# Patient Record
Sex: FEMALE | URBAN - METROPOLITAN AREA
[De-identification: names, ages, dates, MRNs, and addresses within clinical notes are randomized per-mention and may not be internally consistent; named-entity substitution may affect disease eponyms.]

---

## 2018-02-06 PROBLEM — Z00.00 ENCOUNTER FOR PREVENTIVE HEALTH EXAMINATION: Status: ACTIVE | Noted: 2018-02-06

## 2018-02-09 NOTE — ASU PATIENT PROFILE, ADULT - VISION (WITH CORRECTIVE LENSES IF THE PATIENT USUALLY WEARS THEM):
Cane/Partially impaired: cannot see medication labels or newsprint, but can see obstacles in path, and the surrounding layout; can count fingers at arm's length

## 2018-02-09 NOTE — ASU PATIENT PROFILE, ADULT - PMH
CKD (chronic kidney disease)    HTN (hypertension)    IDDM (insulin dependent diabetes mellitus)    Lymphedema

## 2018-02-13 ENCOUNTER — OUTPATIENT (OUTPATIENT)
Dept: OUTPATIENT SERVICES | Facility: HOSPITAL | Age: 73
LOS: 1 days | End: 2018-02-13
Payer: MEDICARE

## 2018-02-13 ENCOUNTER — TRANSCRIPTION ENCOUNTER (OUTPATIENT)
Age: 73
End: 2018-02-13

## 2018-02-13 VITALS
HEART RATE: 74 BPM | RESPIRATION RATE: 16 BRPM | SYSTOLIC BLOOD PRESSURE: 152 MMHG | OXYGEN SATURATION: 100 % | DIASTOLIC BLOOD PRESSURE: 75 MMHG

## 2018-02-13 VITALS
SYSTOLIC BLOOD PRESSURE: 177 MMHG | HEIGHT: 61 IN | TEMPERATURE: 98 F | OXYGEN SATURATION: 100 % | HEART RATE: 78 BPM | RESPIRATION RATE: 13 BRPM | WEIGHT: 274.26 LBS | DIASTOLIC BLOOD PRESSURE: 91 MMHG

## 2018-02-13 DIAGNOSIS — H25.21 AGE-RELATED CATARACT, MORGAGNIAN TYPE, RIGHT EYE: ICD-10-CM

## 2018-02-13 DIAGNOSIS — Z41.9 ENCOUNTER FOR PROCEDURE FOR PURPOSES OTHER THAN REMEDYING HEALTH STATE, UNSPECIFIED: Chronic | ICD-10-CM

## 2018-02-13 LAB — GLUCOSE BLDC GLUCOMTR-MCNC: 166 MG/DL — HIGH (ref 70–99)

## 2018-02-13 PROCEDURE — 66984 XCAPSL CTRC RMVL W/O ECP: CPT | Mod: RT

## 2018-02-13 PROCEDURE — 82962 GLUCOSE BLOOD TEST: CPT

## 2018-02-13 PROCEDURE — C1780: CPT

## 2018-02-13 NOTE — ASU DISCHARGE PLAN (ADULT/PEDIATRIC). - NOTIFY
Bleeding that does not stop/Swelling that continues/Fever greater than 101/Pain not relieved by Medications/Persistent Nausea and Vomiting

## 2018-02-13 NOTE — ASU DISCHARGE PLAN (ADULT/PEDIATRIC). - NURSING INSTRUCTIONS
Tylenol or extra strength tylenol if needed for discomfort, avoid   Advil, Motrin, Aleve and aspirin to minimize bleeding.

## 2022-11-07 ENCOUNTER — OFFICE (OUTPATIENT)
Dept: URBAN - METROPOLITAN AREA CLINIC 90 | Facility: CLINIC | Age: 77
Setting detail: OPHTHALMOLOGY
End: 2022-11-07
Payer: MEDICARE

## 2022-11-07 DIAGNOSIS — H50.10: ICD-10-CM

## 2022-11-07 DIAGNOSIS — H18.053: ICD-10-CM

## 2022-11-07 DIAGNOSIS — H21.233: ICD-10-CM

## 2022-11-07 DIAGNOSIS — H40.1132: ICD-10-CM

## 2022-11-07 DIAGNOSIS — H26.491: ICD-10-CM

## 2022-11-07 DIAGNOSIS — H35.341: ICD-10-CM

## 2022-11-07 DIAGNOSIS — H25.12: ICD-10-CM

## 2022-11-07 DIAGNOSIS — E11.9: ICD-10-CM

## 2022-11-07 PROCEDURE — 92014 COMPRE OPH EXAM EST PT 1/>: CPT | Performed by: OPHTHALMOLOGY

## 2022-11-07 ASSESSMENT — REFRACTION_MANIFEST
OD_VA1: 20/60-1
OS_AXIS: 060
OS_VA1: 20/80
OS_SPHERE: -1.00
OD_AXIS: 105
OD_CYLINDER: -2.00
OS_VA2: 20/50-
OS_CYLINDER: -3.25
OD_AXIS: 105
OS_AXIS: 075
OS_SPHERE: -2.50
OS_CYLINDER: -1.00
OD_SPHERE: -0.25
OD_VA1: 20/60
OD_VA2: 20/50-
OD_ADD: +3.00
OD_SPHERE: -0.25
OD_CYLINDER: -2.00
OS_ADD: +3.00

## 2022-11-07 ASSESSMENT — KERATOMETRY
OD_K1POWER_DIOPTERS: 41.75
OS_K2POWER_DIOPTERS: 43.75
METHOD_AUTO_MANUAL: AUTO
OS_K1POWER_DIOPTERS: 42.00
OD_AXISANGLE_DEGREES: 014
OD_K2POWER_DIOPTERS: 43.00
OS_AXISANGLE_DEGREES: 141

## 2022-11-07 ASSESSMENT — REFRACTION_CURRENTRX
OS_SPHERE: -3.00
OS_VPRISM_DIRECTION: SV
OD_VPRISM_DIRECTION: SV
OS_OVR_VA: 20/
OD_AXIS: 109
OS_SPHERE: +0.50
OS_AXIS: 057
OD_AXIS: 121
OS_OVR_VA: 20/
OD_SPHERE: +2.75
OS_AXIS: 059
OD_OVR_VA: 20/
OS_CYLINDER: -1.00
OD_AXIS: 105
OD_SPHERE: -0.25
OS_AXIS: 062
OS_AXIS: 057
OS_CYLINDER: -1.00
OS_VPRISM_DIRECTION: SV
OS_CYLINDER: -1.00
OD_SPHERE: -0.25
OD_CYLINDER: -2.00
OS_SPHERE: -2.50
OS_VPRISM_DIRECTION: SV
OS_VPRISM_DIRECTION: SV
OD_AXIS: 098
OD_VPRISM_DIRECTION: SV
OD_CYLINDER: -2.00
OS_CYLINDER: -1.25
OS_OVR_VA: 20/
OD_SPHERE: -3.00
OS_SPHERE: -2.50
OD_VPRISM_DIRECTION: SV
OD_CYLINDER: -1.25
OD_CYLINDER: -2.00
OD_OVR_VA: 20/
OD_OVR_VA: 20/
OD_VPRISM_DIRECTION: SV

## 2022-11-07 ASSESSMENT — REFRACTION_AUTOREFRACTION
OD_AXIS: 104
OS_AXIS: 077
OS_SPHERE: -0.50
OD_SPHERE: -0.50
OS_CYLINDER: -4.00
OD_CYLINDER: -1.50

## 2022-11-07 ASSESSMENT — CONFRONTATIONAL VISUAL FIELD TEST (CVF)
OS_FINDINGS: FULL
OD_FINDINGS: FULL

## 2022-11-07 ASSESSMENT — SPHEQUIV_DERIVED
OS_SPHEQUIV: -3
OS_SPHEQUIV: -2.625
OD_SPHEQUIV: -1.25
OS_SPHEQUIV: -2.5
OD_SPHEQUIV: -1.25
OD_SPHEQUIV: -1.25

## 2022-11-07 ASSESSMENT — AXIALLENGTH_DERIVED
OD_AL: 24.5278
OS_AL: 25.0784
OS_AL: 24.9144
OS_AL: 24.8602
OD_AL: 24.5278
OD_AL: 24.5278

## 2022-11-07 ASSESSMENT — TONOMETRY
OS_IOP_MMHG: 11
OD_IOP_MMHG: 11

## 2022-11-07 ASSESSMENT — PUNCTA - ASSESSMENT
OD_PUNCTA: RLL EVERSION
OS_PUNCTA: LLL EVERSION

## 2022-11-07 ASSESSMENT — VISUAL ACUITY
OD_BCVA: 20/150
OS_BCVA: 20/60-2

## 2023-03-27 ENCOUNTER — OFFICE (OUTPATIENT)
Dept: URBAN - METROPOLITAN AREA CLINIC 90 | Facility: CLINIC | Age: 78
Setting detail: OPHTHALMOLOGY
End: 2023-03-27
Payer: MEDICARE

## 2023-03-27 DIAGNOSIS — H21.233: ICD-10-CM

## 2023-03-27 DIAGNOSIS — H40.1132: ICD-10-CM

## 2023-03-27 DIAGNOSIS — H18.053: ICD-10-CM

## 2023-03-27 DIAGNOSIS — H26.491: ICD-10-CM

## 2023-03-27 DIAGNOSIS — H25.12: ICD-10-CM

## 2023-03-27 DIAGNOSIS — H35.341: ICD-10-CM

## 2023-03-27 DIAGNOSIS — H50.10: ICD-10-CM

## 2023-03-27 DIAGNOSIS — E11.9: ICD-10-CM

## 2023-03-27 PROCEDURE — 92134 CPTRZ OPH DX IMG PST SGM RTA: CPT | Performed by: OPHTHALMOLOGY

## 2023-03-27 PROCEDURE — 92014 COMPRE OPH EXAM EST PT 1/>: CPT | Performed by: OPHTHALMOLOGY

## 2023-03-27 ASSESSMENT — REFRACTION_MANIFEST
OS_AXIS: 075
OS_VA2: 20/50-
OD_VA1: 20/60-1
OD_AXIS: 105
OS_VA1: 20/80
OS_ADD: +3.00
OD_CYLINDER: -2.00
OD_VA2: 20/50-
OS_SPHERE: -1.00
OD_SPHERE: -0.25
OS_CYLINDER: -1.00
OD_SPHERE: -0.25
OD_ADD: +3.00
OS_CYLINDER: -3.25
OD_VA1: 20/60
OD_AXIS: 105
OD_CYLINDER: -2.00
OS_SPHERE: -2.50
OS_AXIS: 060

## 2023-03-27 ASSESSMENT — AXIALLENGTH_DERIVED
OD_AL: 24.4723
OS_AL: 25.1304
OS_AL: 24.9657
OS_AL: 25.0203
OD_AL: 24.5776
OD_AL: 24.5776

## 2023-03-27 ASSESSMENT — PUNCTA - ASSESSMENT
OD_PUNCTA: RLL EVERSION
OS_PUNCTA: LLL EVERSION

## 2023-03-27 ASSESSMENT — REFRACTION_CURRENTRX
OS_CYLINDER: -1.00
OS_VPRISM_DIRECTION: SV
OD_CYLINDER: -1.25
OD_VPRISM_DIRECTION: SV
OS_SPHERE: -2.50
OD_CYLINDER: -2.00
OD_VPRISM_DIRECTION: SV
OD_SPHERE: -3.00
OS_CYLINDER: -1.00
OS_CYLINDER: -1.25
OD_OVR_VA: 20/
OS_OVR_VA: 20/
OD_OVR_VA: 20/
OD_VPRISM_DIRECTION: SV
OS_AXIS: 065
OS_AXIS: 059
OD_AXIS: 121
OS_SPHERE: -2.50
OS_AXIS: 057
OD_OVR_VA: 20/
OS_OVR_VA: 20/
OS_AXIS: 057
OS_OVR_VA: 20/
OD_CYLINDER: -2.00
OS_VPRISM_DIRECTION: SV
OD_AXIS: 098
OD_SPHERE: -0.25
OD_SPHERE: +2.75
OS_SPHERE: +0.50
OD_SPHERE: -0.25
OS_CYLINDER: -1.00
OS_VPRISM_DIRECTION: SV
OD_AXIS: 109
OS_SPHERE: -3.00
OS_VPRISM_DIRECTION: SV
OD_CYLINDER: -2.00
OD_VPRISM_DIRECTION: SV
OD_AXIS: 109

## 2023-03-27 ASSESSMENT — KERATOMETRY
METHOD_AUTO_MANUAL: AUTO
OS_AXISANGLE_DEGREES: 142
OS_K2POWER_DIOPTERS: 43.50
OD_K1POWER_DIOPTERS: 41.75
OD_AXISANGLE_DEGREES: 018
OS_K1POWER_DIOPTERS: 42.00
OD_K2POWER_DIOPTERS: 42.75

## 2023-03-27 ASSESSMENT — CONFRONTATIONAL VISUAL FIELD TEST (CVF)
OS_FINDINGS: FULL
OD_FINDINGS: FULL

## 2023-03-27 ASSESSMENT — VISUAL ACUITY
OD_BCVA: 20/150+1
OS_BCVA: 20/60-2

## 2023-03-27 ASSESSMENT — REFRACTION_AUTOREFRACTION
OS_CYLINDER: -3.00
OD_AXIS: 099
OD_CYLINDER: -1.50
OS_AXIS: 067
OS_SPHERE: -1.25
OD_SPHERE: -0.25

## 2023-03-27 ASSESSMENT — SPHEQUIV_DERIVED
OD_SPHEQUIV: -1
OS_SPHEQUIV: -2.625
OS_SPHEQUIV: -3
OD_SPHEQUIV: -1.25
OS_SPHEQUIV: -2.75
OD_SPHEQUIV: -1.25

## 2023-03-27 ASSESSMENT — TONOMETRY
OD_IOP_MMHG: 15
OS_IOP_MMHG: 14

## 2024-07-12 ENCOUNTER — OFFICE (OUTPATIENT)
Dept: URBAN - METROPOLITAN AREA CLINIC 90 | Facility: CLINIC | Age: 79
Setting detail: OPHTHALMOLOGY
End: 2024-07-12
Payer: MEDICARE

## 2024-07-12 DIAGNOSIS — H26.491: ICD-10-CM

## 2024-07-12 DIAGNOSIS — H21.233: ICD-10-CM

## 2024-07-12 DIAGNOSIS — H40.1132: ICD-10-CM

## 2024-07-12 DIAGNOSIS — H25.12: ICD-10-CM

## 2024-07-12 DIAGNOSIS — H35.341: ICD-10-CM

## 2024-07-12 DIAGNOSIS — H18.053: ICD-10-CM

## 2024-07-12 DIAGNOSIS — H50.10: ICD-10-CM

## 2024-07-12 DIAGNOSIS — E11.9: ICD-10-CM

## 2024-07-12 PROBLEM — H33.102: Status: ACTIVE | Noted: 2024-07-12

## 2024-07-12 PROCEDURE — 92014 COMPRE OPH EXAM EST PT 1/>: CPT | Performed by: OPHTHALMOLOGY

## 2024-07-12 PROCEDURE — 92250 FUNDUS PHOTOGRAPHY W/I&R: CPT | Performed by: OPHTHALMOLOGY

## 2024-07-12 ASSESSMENT — CONFRONTATIONAL VISUAL FIELD TEST (CVF)
OD_FINDINGS: FULL
OS_FINDINGS: FULL

## 2024-11-07 ENCOUNTER — OFFICE (OUTPATIENT)
Age: 79
Setting detail: OPHTHALMOLOGY
End: 2024-11-07
Payer: MEDICARE

## 2024-11-07 DIAGNOSIS — E11.9: ICD-10-CM

## 2024-11-07 DIAGNOSIS — H35.341: ICD-10-CM

## 2024-11-07 DIAGNOSIS — H18.053: ICD-10-CM

## 2024-11-07 DIAGNOSIS — H25.12: ICD-10-CM

## 2024-11-07 DIAGNOSIS — H21.233: ICD-10-CM

## 2024-11-07 DIAGNOSIS — H26.491: ICD-10-CM

## 2024-11-07 DIAGNOSIS — H50.10: ICD-10-CM

## 2024-11-07 DIAGNOSIS — H33.102: ICD-10-CM

## 2024-11-07 DIAGNOSIS — H40.1132: ICD-10-CM

## 2024-11-07 PROCEDURE — 92134 CPTRZ OPH DX IMG PST SGM RTA: CPT | Performed by: OPHTHALMOLOGY

## 2024-11-07 PROCEDURE — 92012 INTRM OPH EXAM EST PATIENT: CPT | Performed by: OPHTHALMOLOGY

## 2024-11-07 ASSESSMENT — REFRACTION_MANIFEST
OD_VA2: 20/50-
OD_AXIS: 105
OS_VA2: 20/50-
OS_CYLINDER: -3.25
OS_ADD: +3.00
OD_AXIS: 105
OD_CYLINDER: -2.00
OD_SPHERE: -0.25
OD_VA1: 20/60
OS_AXIS: 060
OS_SPHERE: -1.00
OS_SPHERE: -2.50
OS_AXIS: 075
OD_SPHERE: -0.25
OS_VA1: 20/80
OS_CYLINDER: -1.00
OD_ADD: +3.00
OD_CYLINDER: -2.00
OD_VA1: 20/60-1

## 2024-11-07 ASSESSMENT — REFRACTION_CURRENTRX
OS_SPHERE: -2.50
OD_SPHERE: -0.25
OD_AXIS: 098
OS_OVR_VA: 20/
OS_SPHERE: -2.50
OS_AXIS: 057
OD_AXIS: 109
OD_VPRISM_DIRECTION: SV
OS_CYLINDER: -1.00
OS_SPHERE: +0.50
OD_CYLINDER: -2.00
OS_VPRISM_DIRECTION: SV
OS_CYLINDER: -1.00
OD_SPHERE: +2.75
OD_OVR_VA: 20/
OS_AXIS: 057
OD_AXIS: 107
OS_VPRISM_DIRECTION: SV
OS_VPRISM_DIRECTION: SV
OD_SPHERE: -0.25
OS_CYLINDER: -1.00
OD_VPRISM_DIRECTION: SV
OD_OVR_VA: 20/
OD_AXIS: 109
OD_OVR_VA: 20/
OS_AXIS: 058
OD_CYLINDER: -2.00
OS_CYLINDER: -1.00
OD_CYLINDER: -2.00
OS_SPHERE: -2.50
OS_SPHERE: -2.50
OD_SPHERE: -0.25
OS_AXIS: 062
OD_CYLINDER: -2.00
OS_VPRISM_DIRECTION: SV
OD_CYLINDER: -2.00
OD_SPHERE: -0.25
OS_OVR_VA: 20/
OD_VPRISM_DIRECTION: SV
OD_AXIS: 112
OD_VPRISM_DIRECTION: SV
OS_CYLINDER: -1.00
OS_AXIS: 065
OS_OVR_VA: 20/

## 2024-11-07 ASSESSMENT — KERATOMETRY
OD_K2POWER_DIOPTERS: 42.75
OD_AXISANGLE_DEGREES: 025
METHOD_AUTO_MANUAL: AUTO
OS_AXISANGLE_DEGREES: 151
OD_K1POWER_DIOPTERS: 42.25
OS_K2POWER_DIOPTERS: 43.50
OS_K1POWER_DIOPTERS: 41.50

## 2024-11-07 ASSESSMENT — VISUAL ACUITY
OS_BCVA: 20/70
OD_BCVA: 20/100-1

## 2024-11-07 ASSESSMENT — CORNEAL DYSTROPHY - POSTERIOR
OD_POSTERIORDYSTROPHY: GUTTATA
OS_POSTERIORDYSTROPHY: GUTTATA

## 2024-11-07 ASSESSMENT — REFRACTION_AUTOREFRACTION
OS_AXIS: 075
OS_SPHERE: -0.50
OD_CYLINDER: -1.25
OS_CYLINDER: -3.25
OD_AXIS: 108
OD_SPHERE: -0.25

## 2024-11-07 ASSESSMENT — PUNCTA - ASSESSMENT
OD_PUNCTA: RLL EVERSION
OS_PUNCTA: LLL EVERSION

## 2024-11-07 ASSESSMENT — TONOMETRY
OD_IOP_MMHG: 18
OS_IOP_MMHG: 16

## 2025-03-17 ENCOUNTER — RX ONLY (RX ONLY)
Age: 80
End: 2025-03-17

## 2025-03-17 ENCOUNTER — OFFICE (OUTPATIENT)
Facility: LOCATION | Age: 80
Setting detail: OPHTHALMOLOGY
End: 2025-03-17
Payer: MEDICARE

## 2025-03-17 DIAGNOSIS — H18.053: ICD-10-CM

## 2025-03-17 DIAGNOSIS — H26.491: ICD-10-CM

## 2025-03-17 DIAGNOSIS — H25.12: ICD-10-CM

## 2025-03-17 DIAGNOSIS — H50.10: ICD-10-CM

## 2025-03-17 DIAGNOSIS — H35.341: ICD-10-CM

## 2025-03-17 DIAGNOSIS — H21.233: ICD-10-CM

## 2025-03-17 DIAGNOSIS — H40.1132: ICD-10-CM

## 2025-03-17 DIAGNOSIS — E11.9: ICD-10-CM

## 2025-03-17 PROCEDURE — 92012 INTRM OPH EXAM EST PATIENT: CPT | Performed by: STUDENT IN AN ORGANIZED HEALTH CARE EDUCATION/TRAINING PROGRAM

## 2025-03-17 PROCEDURE — 92133 CPTRZD OPH DX IMG PST SGM ON: CPT | Performed by: STUDENT IN AN ORGANIZED HEALTH CARE EDUCATION/TRAINING PROGRAM

## 2025-03-17 ASSESSMENT — CORNEAL DYSTROPHY - POSTERIOR
OD_POSTERIORDYSTROPHY: GUTTATA
OS_POSTERIORDYSTROPHY: GUTTATA

## 2025-03-17 ASSESSMENT — TONOMETRY
OD_IOP_MMHG: 16
OS_IOP_MMHG: 16
OD_IOP_MMHG: 13
OS_IOP_MMHG: 14

## 2025-03-17 ASSESSMENT — CONFRONTATIONAL VISUAL FIELD TEST (CVF)
OD_FINDINGS: FULL
OS_FINDINGS: FULL

## 2025-03-17 ASSESSMENT — PUNCTA - ASSESSMENT
OS_PUNCTA: LLL EVERSION
OD_PUNCTA: RLL EVERSION

## 2025-03-18 ASSESSMENT — REFRACTION_AUTOREFRACTION
OD_AXIS: 110
OS_SPHERE: -0.75
OD_SPHERE: -0.25
OS_CYLINDER: -4.00
OD_CYLINDER: -1.75
OS_AXIS: 078

## 2025-03-18 ASSESSMENT — REFRACTION_CURRENTRX
OS_AXIS: 054
OD_AXIS: 098
OS_SPHERE: -2.50
OS_AXIS: 058
OS_CYLINDER: -1.00
OD_SPHERE: -0.25
OD_CYLINDER: -1.75
OD_VPRISM_DIRECTION: SV
OS_VPRISM_DIRECTION: SV
OS_SPHERE: +0.50
OS_CYLINDER: -1.00
OD_CYLINDER: -2.00
OD_SPHERE: +2.75
OS_CYLINDER: -1.00
OD_SPHERE: -0.25
OD_OVR_VA: 20/
OS_SPHERE: -2.50
OS_SPHERE: -2.50
OD_AXIS: 112
OD_VPRISM_DIRECTION: SV
OS_AXIS: 057
OD_OVR_VA: 20/
OS_SPHERE: -2.50
OD_AXIS: 114
OS_AXIS: 057
OS_OVR_VA: 20/
OS_VPRISM_DIRECTION: SV
OD_VPRISM_DIRECTION: SV
OS_OVR_VA: 20/
OD_AXIS: 109
OD_AXIS: 107
OS_CYLINDER: -1.00
OS_OVR_VA: 20/
OS_VPRISM_DIRECTION: SV
OS_SPHERE: -2.50
OS_VPRISM_DIRECTION: SV
OS_AXIS: 065
OD_CYLINDER: -2.00
OD_SPHERE: -0.25
OD_CYLINDER: -2.00
OS_AXIS: 062
OD_AXIS: 109
OD_SPHERE: -0.25
OD_SPHERE: -0.25
OS_CYLINDER: -1.00
OD_VPRISM_DIRECTION: SV
OD_OVR_VA: 20/
OD_CYLINDER: -2.00
OD_CYLINDER: -2.00
OS_VPRISM_DIRECTION: SV
OS_CYLINDER: -1.00
OD_VPRISM_DIRECTION: SV

## 2025-03-18 ASSESSMENT — REFRACTION_MANIFEST
OS_AXIS: 060
OD_AXIS: 105
OS_SPHERE: -2.50
OD_VA1: 20/60
OD_CYLINDER: -2.00
OS_CYLINDER: -1.00
OS_AXIS: 075
OS_SPHERE: -1.00
OS_VA1: 20/80
OD_CYLINDER: -2.00
OD_SPHERE: -0.25
OD_VA1: 20/60-1
OS_ADD: +3.00
OD_AXIS: 105
OD_VA2: 20/50-
OD_ADD: +3.00
OD_SPHERE: -0.25
OS_CYLINDER: -3.25
OS_VA2: 20/50-

## 2025-03-18 ASSESSMENT — VISUAL ACUITY
OS_BCVA: 20/60+2
OD_BCVA: 20/80+1

## 2025-03-18 ASSESSMENT — KERATOMETRY
OD_K1POWER_DIOPTERS: 42.00
OD_AXISANGLE_DEGREES: 027
OS_K2POWER_DIOPTERS: 43.50
OS_K1POWER_DIOPTERS: 41.50
OS_AXISANGLE_DEGREES: 151
OD_K2POWER_DIOPTERS: 43.00
METHOD_AUTO_MANUAL: AUTO

## 2025-03-27 PROBLEM — E11.9 TYPE 2 DIABETES MELLITUS WITHOUT COMPLICATIONS: Chronic | Status: ACTIVE | Noted: 2018-02-13

## 2025-03-27 PROBLEM — I89.0 LYMPHEDEMA, NOT ELSEWHERE CLASSIFIED: Chronic | Status: ACTIVE | Noted: 2018-02-13

## 2025-03-27 PROBLEM — I10 ESSENTIAL (PRIMARY) HYPERTENSION: Chronic | Status: ACTIVE | Noted: 2018-02-13

## 2025-03-27 PROBLEM — N18.9 CHRONIC KIDNEY DISEASE, UNSPECIFIED: Chronic | Status: ACTIVE | Noted: 2018-02-13

## 2025-04-08 ENCOUNTER — EMERGENCY (EMERGENCY)
Facility: HOSPITAL | Age: 80
LOS: 0 days | Discharge: HOME | End: 2025-04-08
Attending: EMERGENCY MEDICINE
Payer: MEDICARE

## 2025-04-08 VITALS
WEIGHT: 184.97 LBS | OXYGEN SATURATION: 100 % | HEART RATE: 83 BPM | RESPIRATION RATE: 18 BRPM | DIASTOLIC BLOOD PRESSURE: 103 MMHG | HEIGHT: 62 IN | TEMPERATURE: 98 F | SYSTOLIC BLOOD PRESSURE: 170 MMHG

## 2025-04-08 VITALS — SYSTOLIC BLOOD PRESSURE: 105 MMHG | DIASTOLIC BLOOD PRESSURE: 64 MMHG

## 2025-04-08 DIAGNOSIS — Z91.011 ALLERGY TO MILK PRODUCTS: ICD-10-CM

## 2025-04-08 DIAGNOSIS — Z88.0 ALLERGY STATUS TO PENICILLIN: ICD-10-CM

## 2025-04-08 DIAGNOSIS — Z41.9 ENCOUNTER FOR PROCEDURE FOR PURPOSES OTHER THAN REMEDYING HEALTH STATE, UNSPECIFIED: Chronic | ICD-10-CM

## 2025-04-08 DIAGNOSIS — I89.0 LYMPHEDEMA, NOT ELSEWHERE CLASSIFIED: ICD-10-CM

## 2025-04-08 DIAGNOSIS — M79.89 OTHER SPECIFIED SOFT TISSUE DISORDERS: ICD-10-CM

## 2025-04-08 DIAGNOSIS — I82.431 ACUTE EMBOLISM AND THROMBOSIS OF RIGHT POPLITEAL VEIN: ICD-10-CM

## 2025-04-08 LAB
ALBUMIN SERPL ELPH-MCNC: 3.8 G/DL — SIGNIFICANT CHANGE UP (ref 3.3–5)
ALP SERPL-CCNC: 120 U/L — SIGNIFICANT CHANGE UP (ref 40–120)
ALT FLD-CCNC: 16 U/L — SIGNIFICANT CHANGE UP (ref 12–78)
ANION GAP SERPL CALC-SCNC: 7 MMOL/L — SIGNIFICANT CHANGE UP (ref 5–17)
APTT BLD: 34.7 SEC — SIGNIFICANT CHANGE UP (ref 24.5–35.6)
AST SERPL-CCNC: 17 U/L — SIGNIFICANT CHANGE UP (ref 15–37)
BASOPHILS # BLD AUTO: 0.03 K/UL — SIGNIFICANT CHANGE UP (ref 0–0.2)
BASOPHILS NFR BLD AUTO: 0.5 % — SIGNIFICANT CHANGE UP (ref 0–2)
BILIRUB SERPL-MCNC: 0.5 MG/DL — SIGNIFICANT CHANGE UP (ref 0.2–1.2)
BUN SERPL-MCNC: 32 MG/DL — HIGH (ref 7–23)
CALCIUM SERPL-MCNC: 9.5 MG/DL — SIGNIFICANT CHANGE UP (ref 8.5–10.1)
CHLORIDE SERPL-SCNC: 109 MMOL/L — HIGH (ref 96–108)
CO2 SERPL-SCNC: 25 MMOL/L — SIGNIFICANT CHANGE UP (ref 22–31)
CREAT SERPL-MCNC: 1.61 MG/DL — HIGH (ref 0.5–1.3)
EGFR: 32 ML/MIN/1.73M2 — LOW
EGFR: 32 ML/MIN/1.73M2 — LOW
EOSINOPHIL # BLD AUTO: 0.14 K/UL — SIGNIFICANT CHANGE UP (ref 0–0.5)
EOSINOPHIL NFR BLD AUTO: 2.5 % — SIGNIFICANT CHANGE UP (ref 0–6)
GLUCOSE SERPL-MCNC: 144 MG/DL — HIGH (ref 70–99)
HCT VFR BLD CALC: 42.3 % — SIGNIFICANT CHANGE UP (ref 34.5–45)
HGB BLD-MCNC: 13.4 G/DL — SIGNIFICANT CHANGE UP (ref 11.5–15.5)
IMM GRANULOCYTES NFR BLD AUTO: 0.4 % — SIGNIFICANT CHANGE UP (ref 0–0.9)
INR BLD: 0.96 RATIO — SIGNIFICANT CHANGE UP (ref 0.85–1.16)
LYMPHOCYTES # BLD AUTO: 1.38 K/UL — SIGNIFICANT CHANGE UP (ref 1–3.3)
LYMPHOCYTES # BLD AUTO: 25 % — SIGNIFICANT CHANGE UP (ref 13–44)
MAGNESIUM SERPL-MCNC: 1.8 MG/DL — SIGNIFICANT CHANGE UP (ref 1.6–2.6)
MCHC RBC-ENTMCNC: 27.3 PG — SIGNIFICANT CHANGE UP (ref 27–34)
MCHC RBC-ENTMCNC: 31.7 G/DL — LOW (ref 32–36)
MCV RBC AUTO: 86.2 FL — SIGNIFICANT CHANGE UP (ref 80–100)
MONOCYTES # BLD AUTO: 0.3 K/UL — SIGNIFICANT CHANGE UP (ref 0–0.9)
MONOCYTES NFR BLD AUTO: 5.4 % — SIGNIFICANT CHANGE UP (ref 2–14)
NEUTROPHILS # BLD AUTO: 3.65 K/UL — SIGNIFICANT CHANGE UP (ref 1.8–7.4)
NEUTROPHILS NFR BLD AUTO: 66.2 % — SIGNIFICANT CHANGE UP (ref 43–77)
NRBC BLD AUTO-RTO: 0 /100 WBCS — SIGNIFICANT CHANGE UP (ref 0–0)
PLATELET # BLD AUTO: 143 K/UL — LOW (ref 150–400)
POTASSIUM SERPL-MCNC: 4.9 MMOL/L — SIGNIFICANT CHANGE UP (ref 3.5–5.3)
POTASSIUM SERPL-SCNC: 4.9 MMOL/L — SIGNIFICANT CHANGE UP (ref 3.5–5.3)
PROT SERPL-MCNC: 8.3 GM/DL — SIGNIFICANT CHANGE UP (ref 6–8.3)
PROTHROM AB SERPL-ACNC: 10.8 SEC — SIGNIFICANT CHANGE UP (ref 9.9–13.4)
RBC # BLD: 4.91 M/UL — SIGNIFICANT CHANGE UP (ref 3.8–5.2)
RBC # FLD: 13.9 % — SIGNIFICANT CHANGE UP (ref 10.3–14.5)
SODIUM SERPL-SCNC: 141 MMOL/L — SIGNIFICANT CHANGE UP (ref 135–145)
TROPONIN I, HIGH SENSITIVITY RESULT: 15.2 NG/L — SIGNIFICANT CHANGE UP
WBC # BLD: 5.52 K/UL — SIGNIFICANT CHANGE UP (ref 3.8–10.5)
WBC # FLD AUTO: 5.52 K/UL — SIGNIFICANT CHANGE UP (ref 3.8–10.5)

## 2025-04-08 PROCEDURE — 99285 EMERGENCY DEPT VISIT HI MDM: CPT

## 2025-04-08 PROCEDURE — 71275 CT ANGIOGRAPHY CHEST: CPT | Mod: 26

## 2025-04-08 PROCEDURE — 93970 EXTREMITY STUDY: CPT | Mod: 26

## 2025-04-08 RX ORDER — APIXABAN 2.5 MG/1
10 TABLET, FILM COATED ORAL ONCE
Refills: 0 | Status: COMPLETED | OUTPATIENT
Start: 2025-04-08 | End: 2025-04-08

## 2025-04-08 RX ORDER — APIXABAN 2.5 MG/1
2 TABLET, FILM COATED ORAL
Qty: 28 | Refills: 0
Start: 2025-04-08 | End: 2025-04-14

## 2025-04-08 RX ORDER — APIXABAN 2.5 MG/1
1 TABLET, FILM COATED ORAL
Refills: 0
Start: 2025-04-08

## 2025-04-08 RX ADMIN — Medication 250 MILLILITER(S): at 16:22

## 2025-04-08 RX ADMIN — APIXABAN 10 MILLIGRAM(S): 2.5 TABLET, FILM COATED ORAL at 21:23

## 2025-04-08 NOTE — ED ADULT NURSE NOTE - NSFALLUNIVINTERV_ED_ALL_ED
Bed/Stretcher in lowest position, wheels locked, appropriate side rails in place/Call bell, personal items and telephone in reach/Instruct patient to call for assistance before getting out of bed/chair/stretcher/Non-slip footwear applied when patient is off stretcher/[place] to call system/Physically safe environment - no spills, clutter or unnecessary equipment/Purposeful proactive rounding/Room/bathroom lighting operational, light cord in reach

## 2025-04-08 NOTE — ED PROVIDER NOTE - CLINICAL SUMMARY MEDICAL DECISION MAKING FREE TEXT BOX
Patient well appearing female, no new symptoms, asked to come to ER for possible DVT.  Will evaluate for DVT/PE and treat accordingly.  May need follow up with vascular prior to initiating treatment. Patient well appearing female, no new symptoms, asked to come to ER for possible DVT.  Will evaluate for DVT/PE and treat accordingly.  May need follow up with vascular prior to initiating treatment.    DC note:  Patient with nonocclusive DVT.  D/w vascular on call, will treat.  Patient is already scheduled to follow up with vascular surgery in 3 weeks.  Will treat with apixaban, renal function noted.  Patient denies any known contraindications to anticoagulation.  Cautioned on use of blood thinners.  CTA without large thrombus.  Patient and family feel she will be able to follow up well outpatient and does not wish to be admitted.  Discussed results and outcome of today's visit with the patient/family, copy of results given with discharge.  Patient advised to arrange alcantara follow up with their PMD and/or any provided referral(s) within the next few days and given precautions to return to the Emergency Department for any worsening symptoms.

## 2025-04-08 NOTE — ED PROVIDER NOTE - PHYSICAL EXAMINATION
Gen: Alert, NAD, well appearing  Head: NC, AT, EOMI, normal lids/conjunctiva  ENT: normal hearing, patent oropharynx without erythema/exudate, uvula midline  Neck: +supple, no tenderness/meningismus/JVD, +Trachea midline  Pulm: Bilateral BS, normal resp effort, no wheeze/stridor/retractions  CV: RRR, no M/R/G, +dist pulses  Abd: soft, NT/ND, Negative [place] signs, +BS, no palpable masses  Mskel: no erythema/cyanosis, +BL lymphedema  Skin: no rash, warm/dry  Neuro: AAOx3, no apparent sensory/motor deficits, coordination intact

## 2025-04-08 NOTE — ED PROVIDER NOTE - OBJECTIVE STATEMENT
Pertinent PMH/PSH/FHx/SHx and Review of Systems contained within:  Patient presents to the ED for peripheral edema and concern for possible blood clot.  Patient was sent from outpatient radiology today, her PMD referred her to have LE BL US to r/o DVT.  She had findings suggesting possible DVT of left politeal but study limited due to marked lymphadema of LE.  She was advised to come to ER for further evaluation.  She denies any chest pain, dyspnea, increased leg pain or swelling.  She takes lasix for her lymphadema which she says appears similar to before.  She denies use of blood thinners at home.      Relevant PMHx/SHx/SOCHx/FAMH:  HTN, DM, CKD, peripheral edema, glaucoma  Patient denies EtOH/tobacco/illicit substance use.    ROS: No fever/chills, No headache/photophobia/eye pain/changes in vision, No ear pain/sore throat/dysphagia, No chest pain/palpitations, no SOB/cough/wheeze/stridor, No abdominal pain, No N/V/D/melena, no dysuria/frequency/discharge, No neck/back pain, no rash, no changes in neurological status/function.

## 2025-04-08 NOTE — ED PROVIDER NOTE - PATIENT PORTAL LINK FT
You can access the FollowMyHealth Patient Portal offered by [facility] by registering at the following website: [email or web address]. By joining [facility]’s FollowMyHealth portal, you will also be able to view your health information using other applications (apps) compatible with our system.

## 2025-04-08 NOTE — ED ADULT TRIAGE NOTE - CHIEF COMPLAINT QUOTE
Pt AAO x 4 arrived with sister from [facility] c/o being referred for having a blood clot in her left leg. Pt denies SOB Injury or trauma at present time. pt with PMH of HTN DM Lymphedema hx of right leg blood clot 7 years ago .pt with bilateral leg swelling, which pt states have been worse and she states can be considered her normal. pt with Radiology report and CD. report notes " Examination is limited due to patient's habitus and leg lymphedema Thrombus noted within the popliteal vein on the left with only partial compression noted "

## 2025-04-08 NOTE — ED ADULT NURSE NOTE - OBJECTIVE STATEMENT
Patient reports "I had new teats because I have a new doctor and he is doing every test under the sun." Patient with Bilateral Lymphedema which is Chronic. Sent from [facility] Radiology for DVT. Patient denies any complaints

## 2025-04-08 NOTE — ED ADULT NURSE NOTE - CHIEF COMPLAINT QUOTE
Pt AAO x 4 arrived with sister from [facility] c/o being referred for having a blood clot in her left leg. Pt denies SOB Injury or trauma at present time. pt with PMH of HTN DM Lymphedema hx of right leg blood clot 7 years ago .pt with bilateral leg swelling, which pt states have been worse and she states can be considered her normal. pt with Radiology report and CD. report notes " Examination is limited due to patient's habitus and leg lymphedema Thrombus noted within the popliteal vein on the left with only partial compression noted "

## 2025-05-08 ENCOUNTER — APPOINTMENT (OUTPATIENT)
Dept: VASCULAR SURGERY | Facility: CLINIC | Age: 80
End: 2025-05-08
Payer: MEDICARE

## 2025-05-08 VITALS — WEIGHT: 185 LBS | TEMPERATURE: 97.7 F | HEIGHT: 62 IN | BODY MASS INDEX: 34.04 KG/M2

## 2025-05-08 DIAGNOSIS — I89.0 LYMPHEDEMA, NOT ELSEWHERE CLASSIFIED: ICD-10-CM

## 2025-05-08 DIAGNOSIS — Z86.39 PERSONAL HISTORY OF OTHER ENDOCRINE, NUTRITIONAL AND METABOLIC DISEASE: ICD-10-CM

## 2025-05-08 DIAGNOSIS — Z80.6 FAMILY HISTORY OF LEUKEMIA: ICD-10-CM

## 2025-05-08 DIAGNOSIS — Z86.718 PERSONAL HISTORY OF OTHER VENOUS THROMBOSIS AND EMBOLISM: ICD-10-CM

## 2025-05-08 DIAGNOSIS — I10 ESSENTIAL (PRIMARY) HYPERTENSION: ICD-10-CM

## 2025-05-08 PROCEDURE — 93970 EXTREMITY STUDY: CPT

## 2025-05-08 PROCEDURE — 99204 OFFICE O/P NEW MOD 45 MIN: CPT

## 2025-05-08 RX ORDER — FUROSEMIDE 80 MG/1
TABLET ORAL
Refills: 0 | Status: ACTIVE | COMMUNITY

## 2025-05-08 RX ORDER — AMLODIPINE BESYLATE 5 MG/1
TABLET ORAL
Refills: 0 | Status: ACTIVE | COMMUNITY

## 2025-05-08 RX ORDER — GLIPIZIDE 2.5 MG/1
TABLET ORAL
Refills: 0 | Status: ACTIVE | COMMUNITY

## 2025-05-08 RX ORDER — DAPAGLIFLOZIN 10 MG/1
TABLET, FILM COATED ORAL
Refills: 0 | Status: ACTIVE | COMMUNITY

## 2025-05-08 RX ORDER — APIXABAN 5 MG/1
TABLET, FILM COATED ORAL
Refills: 0 | Status: ACTIVE | COMMUNITY

## 2025-05-08 RX ORDER — METFORMIN HYDROCHLORIDE 750 MG/1
TABLET ORAL
Refills: 0 | Status: ACTIVE | COMMUNITY

## 2025-07-28 ENCOUNTER — OFFICE (OUTPATIENT)
Facility: LOCATION | Age: 80
Setting detail: OPHTHALMOLOGY
End: 2025-07-28
Payer: MEDICARE

## 2025-07-28 DIAGNOSIS — E11.9: ICD-10-CM

## 2025-07-28 DIAGNOSIS — H18.053: ICD-10-CM

## 2025-07-28 DIAGNOSIS — H50.10: ICD-10-CM

## 2025-07-28 DIAGNOSIS — H26.491: ICD-10-CM

## 2025-07-28 DIAGNOSIS — H35.341: ICD-10-CM

## 2025-07-28 DIAGNOSIS — H40.1132: ICD-10-CM

## 2025-07-28 DIAGNOSIS — H25.12: ICD-10-CM

## 2025-07-28 PROCEDURE — 92014 COMPRE OPH EXAM EST PT 1/>: CPT | Performed by: STUDENT IN AN ORGANIZED HEALTH CARE EDUCATION/TRAINING PROGRAM

## 2025-07-28 PROCEDURE — 92250 FUNDUS PHOTOGRAPHY W/I&R: CPT | Performed by: STUDENT IN AN ORGANIZED HEALTH CARE EDUCATION/TRAINING PROGRAM

## 2025-07-28 ASSESSMENT — PUNCTA - ASSESSMENT
OD_PUNCTA: RLL EVERSION
OS_PUNCTA: LLL EVERSION

## 2025-07-28 ASSESSMENT — REFRACTION_MANIFEST
OS_AXIS: 075
OD_AXIS: 105
OD_AXIS: 105
OD_CYLINDER: -2.00
OS_VA1: 20/80
OS_SPHERE: -2.50
OD_ADD: +3.00
OS_VA2: 20/50-
OD_CYLINDER: -2.00
OS_CYLINDER: -1.00
OD_VA1: 20/60-1
OD_SPHERE: -0.25
OD_SPHERE: -0.25
OS_AXIS: 060
OS_CYLINDER: -3.25
OD_VA1: 20/60
OS_ADD: +3.00
OS_SPHERE: -1.00
OD_VA2: 20/50-

## 2025-07-28 ASSESSMENT — REFRACTION_CURRENTRX
OS_AXIS: 057
OS_SPHERE: -2.50
OD_AXIS: 114
OS_SPHERE: +0.50
OS_SPHERE: -2.50
OD_SPHERE: -0.25
OS_CYLINDER: -1.00
OS_SPHERE: -2.50
OS_OVR_VA: 20/
OD_SPHERE: -0.25
OS_VPRISM_DIRECTION: SV
OS_AXIS: 062
OS_VPRISM_DIRECTION: SV
OS_AXIS: 054
OD_VPRISM_DIRECTION: SV
OD_AXIS: 109
OD_CYLINDER: -2.00
OD_SPHERE: -0.25
OS_VPRISM_DIRECTION: SV
OS_CYLINDER: -1.00
OD_SPHERE: +2.75
OD_CYLINDER: -2.00
OS_CYLINDER: -1.00
OS_AXIS: 058
OD_VPRISM_DIRECTION: SV
OS_AXIS: 065
OD_VPRISM_DIRECTION: SV
OD_VPRISM_DIRECTION: SV
OD_CYLINDER: -2.00
OD_SPHERE: -0.25
OD_AXIS: 098
OS_CYLINDER: -1.00
OD_AXIS: 107
OS_OVR_VA: 20/
OD_CYLINDER: -1.75
OD_OVR_VA: 20/
OS_CYLINDER: -1.00
OD_OVR_VA: 20/
OS_VPRISM_DIRECTION: SV
OS_CYLINDER: -1.00
OD_OVR_VA: 20/
OD_CYLINDER: -2.00
OS_VPRISM_DIRECTION: SV
OS_SPHERE: -2.50
OD_SPHERE: -0.25
OD_CYLINDER: -2.00
OD_VPRISM_DIRECTION: SV
OS_OVR_VA: 20/
OD_AXIS: 109
OD_AXIS: 112
OS_SPHERE: -2.50
OS_AXIS: 057

## 2025-07-28 ASSESSMENT — VISUAL ACUITY
OD_BCVA: 20/80+1
OS_BCVA: 20/60+2

## 2025-07-28 ASSESSMENT — REFRACTION_AUTOREFRACTION
OD_CYLINDER: -0.75
OD_SPHERE: -1.25
OS_AXIS: 070
OD_AXIS: 115
OS_CYLINDER: -2.75
OS_SPHERE: -1.25

## 2025-07-28 ASSESSMENT — CORNEAL DYSTROPHY - POSTERIOR
OS_POSTERIORDYSTROPHY: GUTTATA
OD_POSTERIORDYSTROPHY: GUTTATA

## 2025-07-28 ASSESSMENT — KERATOMETRY
METHOD_AUTO_MANUAL: AUTO
OD_K2POWER_DIOPTERS: 43.25
OS_K1POWER_DIOPTERS: 42.00
OD_K1POWER_DIOPTERS: 42.25
OS_AXISANGLE_DEGREES: 142
OD_AXISANGLE_DEGREES: 034
OS_K2POWER_DIOPTERS: 43.75